# Patient Record
Sex: FEMALE | Race: WHITE | NOT HISPANIC OR LATINO | Employment: FULL TIME | URBAN - NONMETROPOLITAN AREA
[De-identification: names, ages, dates, MRNs, and addresses within clinical notes are randomized per-mention and may not be internally consistent; named-entity substitution may affect disease eponyms.]

---

## 2017-11-02 ENCOUNTER — OFFICE VISIT (OUTPATIENT)
Dept: CARDIOLOGY | Facility: CLINIC | Age: 27
End: 2017-11-02

## 2017-11-02 ENCOUNTER — TELEPHONE (OUTPATIENT)
Dept: CARDIOLOGY | Facility: CLINIC | Age: 27
End: 2017-11-02

## 2017-11-02 VITALS
SYSTOLIC BLOOD PRESSURE: 121 MMHG | HEART RATE: 89 BPM | BODY MASS INDEX: 23 KG/M2 | OXYGEN SATURATION: 100 % | WEIGHT: 121.8 LBS | DIASTOLIC BLOOD PRESSURE: 77 MMHG | HEIGHT: 61 IN

## 2017-11-02 DIAGNOSIS — R00.0 TACHYCARDIA: ICD-10-CM

## 2017-11-02 DIAGNOSIS — R00.2 PALPITATION: Primary | ICD-10-CM

## 2017-11-02 DIAGNOSIS — R06.02 SHORTNESS OF BREATH: ICD-10-CM

## 2017-11-02 PROCEDURE — 99204 OFFICE O/P NEW MOD 45 MIN: CPT | Performed by: PHYSICIAN ASSISTANT

## 2017-11-02 PROCEDURE — 93000 ELECTROCARDIOGRAM COMPLETE: CPT | Performed by: PHYSICIAN ASSISTANT

## 2017-11-02 NOTE — TELEPHONE ENCOUNTER
----- Message from KRISTI Wen sent at 11/2/2017 10:21 AM EDT -----  Regarding: Labs  Please obtain labs from PCP.

## 2017-11-02 NOTE — PROGRESS NOTES
Subjective   Dustin Navarrete is a 27 y.o. female     Chief Complaint   Patient presents with   • Establish Care     patient appears in office today to [facility] cardiac care ; denies CP/SOA   • Palpitations     patient states she is having increased episodes of palpitations at random   CC:  Fast heart beat with shortness of air.    HPI  The patient presents in today for initial evaluation.  She is referred to us in the setting of palpitations/tachycardia with associated shortness of air.  The patient reports that she has had no cardiovascular issues or symptoms in the past.  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  She is now working third shift.  She feels that the majority of symptoms may have started with stress/anxiety from her potential occupation.  She notes that at times, she will feel an irregularity in heart rhythm as well as a tachycardic sensation.  This can last just a couple of seconds or can reoccur numerous times throughout the day.  Typically, and worse than usual, she will become very short of air.  She cannot associate this with increased caffeine use or any other lifestyle issue.  The patient has no associated syncope.  She has no associated chest pain at that time.  She has found nothing else which can potentially alleviate or exacerbate symptoms.  She does note that exercise capacity has remained normal.  She tries to work out as often as she can.  She has no limitation with palpitations or shortness of air at that time.  She did see her primary care recently and had labs performed.  She tells me that those were normal.  We have requested copies of those.  The patient has no further complaints otherwise and feels that she is doing well outside of palpitations.      No current outpatient prescriptions on file.     No current facility-administered medications for this visit.        Review of patient's allergies indicates no known allergies.    History reviewed. No pertinent past  medical history.    Social History     Social History   • Marital status: Single     Spouse name: N/A   • Number of children: N/A   • Years of education: N/A     Occupational History   • Not on file.     Social History Main Topics   • Smoking status: Never Smoker   • Smokeless tobacco: Never Used   • Alcohol use Not on file   • Drug use: No   • Sexual activity: Defer     Other Topics Concern   • Not on file     Social History Narrative   • No narrative on file       Family History   Problem Relation Age of Onset   • Adopted: Yes   • Family history unknown: Yes       Review of Systems   Constitutional: Negative.  Negative for fatigue.   HENT: Positive for sore throat (from drainage). Negative for congestion, rhinorrhea, sinus pain, sinus pressure and sneezing.    Eyes: Positive for visual disturbance (wears glasses/contacts).   Respiratory: Negative.  Negative for cough, chest tightness, shortness of breath (denies SOA) and wheezing.    Cardiovascular: Positive for palpitations (increased episodes palpitations/flutters). Negative for chest pain (denies CP) and leg swelling.   Gastrointestinal: Negative.  Negative for abdominal pain, constipation, diarrhea, nausea and vomiting.   Endocrine: Negative.  Negative for cold intolerance, heat intolerance, polyphagia and polyuria.   Genitourinary: Negative.  Negative for difficulty urinating, frequency and urgency.   Musculoskeletal: Negative.  Negative for arthralgias, back pain, myalgias, neck pain and neck stiffness.   Skin: Negative.  Negative for rash and wound.   Allergic/Immunologic: Positive for environmental allergies (seaonal allergies). Negative for food allergies.   Neurological: Negative.  Negative for dizziness, weakness, light-headedness and headaches.   Hematological: Negative.  Does not bruise/bleed easily.   Psychiatric/Behavioral: Negative for agitation, confusion and sleep disturbance (denies waking up smothering/SOA). The patient is not nervous/anxious.   "      Objective     /77 (BP Location: Left arm, Patient Position: Sitting)  Pulse 89  Ht 61\" (154.9 cm)  Wt 121 lb 12.8 oz (55.2 kg)  SpO2 100%  BMI 23.01 kg/m2    Lab Results (most recent)     None          Physical Exam   Constitutional: She is oriented to person, place, and time. She appears well-developed and well-nourished. No distress.   HENT:   Head: Normocephalic and atraumatic.   Eyes: Conjunctivae and EOM are normal. Pupils are equal, round, and reactive to light.   Neck: Normal range of motion. Neck supple. No JVD present. No tracheal deviation present.   Cardiovascular: Normal rate, regular rhythm, normal heart sounds and intact distal pulses.    Pulmonary/Chest: Effort normal and breath sounds normal.   Abdominal: Soft. Bowel sounds are normal. She exhibits no distension and no mass. There is no tenderness. There is no rebound and no guarding.   Musculoskeletal: Normal range of motion. She exhibits no edema, tenderness or deformity.   Neurological: She is alert and oriented to person, place, and time.   Skin: Skin is warm and dry. No rash noted. No erythema. No pallor.   Psychiatric: She has a normal mood and affect. Her behavior is normal. Judgment and thought content normal.   Nursing note and vitals reviewed.      Procedure     ECG 12 Lead  Date/Time: 11/2/2017 9:49 AM  Performed by: RAMON HUNTER  Authorized by: RAMON HUNTER   Comments: Palpitations    Sinus arrhythmia, normal axis, RSR prime in V1 and V2, minor nonspecific ST and T-wave changes, no acute changes noted.                 Assessment/Plan      Diagnosis Plan   1. Palpitation  ECG 12 Lead    Adult Transthoracic Echo Complete W/ Cont if Necessary Per Protocol    Cardiac Event Monitor    Adult Transthoracic Echo Complete W/ Cont if Necessary Per Protocol    Cardiac Event Monitor   2. Tachycardia     3. Shortness of breath  Adult Transthoracic Echo Complete W/ Cont if Necessary Per Protocol    Cardiac Event Monitor    " Adult Transthoracic Echo Complete W/ Cont if Necessary Per Protocol    Cardiac Event Monitor     I would like to schedule for an event monitor to see what the patient is experiencing when she complains of palpitations.  With symptoms otherwise, I will schedule for an event monitor.  We have requested recent labs including her CBC, CMP, and TSH.  I would like to see her back after testing and recommend further to her at that time.  For any issues prior to follow-up, she will call to us.

## 2017-11-21 ENCOUNTER — OUTSIDE FACILITY SERVICE (OUTPATIENT)
Dept: CARDIOLOGY | Facility: CLINIC | Age: 27
End: 2017-11-21

## 2017-11-21 PROCEDURE — 93272 ECG/REVIEW INTERPRET ONLY: CPT | Performed by: INTERNAL MEDICINE

## 2022-08-18 ENCOUNTER — OFFICE VISIT (OUTPATIENT)
Dept: SURGERY | Facility: CLINIC | Age: 32
End: 2022-08-18

## 2022-08-18 VITALS
HEART RATE: 72 BPM | HEIGHT: 61 IN | DIASTOLIC BLOOD PRESSURE: 60 MMHG | SYSTOLIC BLOOD PRESSURE: 102 MMHG | WEIGHT: 125.6 LBS | BODY MASS INDEX: 23.71 KG/M2

## 2022-08-18 DIAGNOSIS — R22.32 SUBCUTANEOUS MASS OF LEFT FOREARM: Primary | ICD-10-CM

## 2022-08-18 PROCEDURE — 99202 OFFICE O/P NEW SF 15 MIN: CPT | Performed by: SURGERY

## 2022-08-18 PROCEDURE — 76882 US LMTD JT/FCL EVL NVASC XTR: CPT | Performed by: SURGERY

## 2022-08-18 NOTE — PROGRESS NOTES
"Subjective   Dustin LAWLER is a 31 y.o. female here today for cyst on left arm.    History of Present Illness  Ms. Lawler was seen in the office today for evaluation of a subcutaneous mass of the left forearm.  It is not really symptomatic but patient was advised to have it evaluated and see if excision was indicated rather than wait for her to get larger.  No erythema or drainage.  Patient feels that it has decreased in size since she first noticed it  No Known Allergies  No current outpatient medications on file.     No current facility-administered medications for this visit.     History reviewed. No pertinent past medical history.  Past Surgical History:   Procedure Laterality Date   • MOUTH SURGERY         Pertinent Review of Systems:  Respiratory: no shortness of breath  Cardiovascular: no chest pain  Other pertinent:      Objective   /60 (BP Location: Left arm)   Pulse 72   Ht 154.9 cm (61\")   Wt 57 kg (125 lb 9.6 oz)   BMI 23.73 kg/m²   Physical Exam  Well-developed well-nourished female no acute distress  Left forearm: In the medial upper area there is a subcentimeter palpable mass.  It is less well-defined than the typical sebaceous cyst and not consistent with a lipoma.  Procedures   Ultrasound soft tissue left forearm    Indication: Subcutaneous mass  Description: With use of the 12.5 MHz transducer the area of clinical concern was scanned in multiple planes.  Findings: In the area of clinical concern a hypoechoic mass measuring 0.7 x 0.6 cm is identified.  There does appear to be some surrounding inflammation.  This does not extend to the dermis and is therefore not consistent with a sebaceous cyst  Impression: Inflammatory subcutaneous mass left forearm  Plan: Follow-up as clinically indicated  Results/Data:      Assessment & Plan   Subcutaneous mass left forearm.  By ultrasound this does appear to have some surrounding inflammation which would be consistent with the patient's history of size " decrease.    As the area is getting smaller do not see an indication for excision at this time.  Unless the lesion becomes significantly larger observation only         Discussion/Summary    Time spent:     BMI is within normal parameters. No other follow-up for BMI required.       No future appointments.      Please note that portions of this note were completed with a voice recognition program.

## 2023-03-02 ENCOUNTER — TELEPHONE (OUTPATIENT)
Dept: CARDIOLOGY | Facility: CLINIC | Age: 33
End: 2023-03-02

## 2023-05-11 ENCOUNTER — OFFICE VISIT (OUTPATIENT)
Dept: CARDIOLOGY | Facility: CLINIC | Age: 33
End: 2023-05-11
Payer: COMMERCIAL

## 2023-05-11 VITALS
HEIGHT: 61 IN | HEART RATE: 89 BPM | BODY MASS INDEX: 22.16 KG/M2 | SYSTOLIC BLOOD PRESSURE: 124 MMHG | DIASTOLIC BLOOD PRESSURE: 82 MMHG | WEIGHT: 117.4 LBS

## 2023-05-11 DIAGNOSIS — R00.2 PALPITATIONS: Primary | ICD-10-CM

## 2023-05-11 DIAGNOSIS — R06.02 SHORTNESS OF BREATH: ICD-10-CM

## 2023-05-11 DIAGNOSIS — F17.290 VAPING NICOTINE DEPENDENCE, TOBACCO PRODUCT: ICD-10-CM

## 2023-05-11 DIAGNOSIS — R01.1 MURMUR, CARDIAC: ICD-10-CM

## 2023-05-11 DIAGNOSIS — R07.2 PRECORDIAL PAIN: ICD-10-CM

## 2023-05-11 DIAGNOSIS — R42 DIZZINESS: ICD-10-CM

## 2023-05-11 PROCEDURE — 99204 OFFICE O/P NEW MOD 45 MIN: CPT | Performed by: INTERNAL MEDICINE

## 2023-05-11 PROCEDURE — 93000 ELECTROCARDIOGRAM COMPLETE: CPT | Performed by: INTERNAL MEDICINE

## 2023-05-11 RX ORDER — BISOPROLOL FUMARATE 5 MG/1
5 TABLET, FILM COATED ORAL DAILY
Qty: 30 TABLET | Refills: 6 | Status: SHIPPED | OUTPATIENT
Start: 2023-05-11

## 2023-05-11 RX ORDER — NICOTINE 10 MG
CARTRIDGE (EA) INHALATION
Qty: 168 EACH | Refills: 5 | Status: SHIPPED | OUTPATIENT
Start: 2023-05-11

## 2023-05-11 NOTE — LETTER
"May 11, 2023       No Recipients    Patient: Dustin LAWLER   YOB: 1990   Date of Visit: 5/11/2023       Dear Dr. Ortiz Recipients:    Thank you for referring Dustin LAWLER to me for evaluation. Below are the relevant portions of my assessment and plan of care.    If you have questions, please do not hesitate to call me. I look forward to following Dustin along with you.         Sincerely,        Cyndy Whipple MD        CC:   No Recipients    Cyndy Whipple MD  05/11/23 1326  Sign when Signing Visit  Dustin LAWLER  reports that she has never smoked. She has never used smokeless tobacco.. I have educated her on the risk of diseases from using tobacco products such as cancer, COPD and heart disease.     I advised her to quit and she is willing to quit. We have discussed the following method/s for tobacco cessation:  Education Material Counseling Prescription Medicaiton.  Together we have set a quit date for 1 week from today.  She will follow up with me in 6 months or sooner to check on her progress.    I spent 3  minutes counseling the patient.            Cyndy Whipple MD  05/11/23 1326  Sign when Signing Visit  Chief Complaint   Patient presents with   • Establish Care     Patient referred per PCP for syncope.   • Palpitations     Has had for several years, states \"they come and go\". Notices more with stress. Has some shortness of breath associated with palpitations.   • Dizziness     Notices when squatting down and stands up, vision turns black and ringing in ears, will have to hold to walls. Reports episodes only last 15 seconds.   • Chest Pain     Will have occasional sharp pain in chest, at times will cause her to become short of breath. Pain lasting 10 minutes.   • Aspirin     Patient does not take.        CARDIAC COMPLAINTS  chest pressure/discomfort, Dizziness and palpitations     Subjective   Dustin LAWLER is a 32 y.o. female came in today with her mother for the initial cardiac " evaluation.  She has no previously diagnosed cardiac history.  She has been having symptoms of palpitation on and off for many years.  She never paid attention to it because it normally last for few seconds and subsides.  It usually occurs with stress.  Recently she started noticing the palpitations occurring more frequently and longer lasting.  It is now associated with shortness of breath and also started having dizziness.  She also notices the dizziness occurs when she suddenly squats down or when she suddenly turned back.  It is associated with some ringing in the ears and she also noticed the room spinning.  It normally last about 15 seconds.  She also started noticing chest pain in the form of sharp pain in the middle part of the chest occurring suddenly without any precipitating factors last about 10 minutes and then subsides.  She did undergo lab work and found to have a normal thyroid function test.  Normal electrolytes normal blood count.  She has no history of smoking but unfortunately does use vaping.  Her biological mother  of a cancer.  She does not know much about her biological family.    Past Surgical History:   Procedure Laterality Date   • MOUTH SURGERY         Current Outpatient Medications   Medication Sig Dispense Refill   • bisoprolol (ZEBeta) 5 MG tablet Take 1 tablet by mouth Daily. 30 tablet 6   • nicotine (Nicotrol) 10 MG inhaler 1 cartridge over 20 minutes. Max dose 16 cartridges daily 168 each 5     No current facility-administered medications for this visit.          ALLERGIES:  Patient has no known allergies.    Past Medical History:   Diagnosis Date   • Anemia        Social History     Tobacco Use   Smoking Status Never   Smokeless Tobacco Never          Family History   Adopted: Yes   Problem Relation Age of Onset   • Cancer Mother    • Lung cancer Maternal Grandmother    • No Known Problems Daughter    • No Known Problems Son        Review of Systems   Constitutional: Negative  "for decreased appetite and malaise/fatigue.   HENT: Negative for congestion and sore throat.    Eyes: Negative for blurred vision, double vision and visual disturbance.   Cardiovascular: Positive for chest pain, dyspnea on exertion and palpitations.   Respiratory: Positive for shortness of breath. Negative for snoring.    Endocrine: Negative for cold intolerance and heat intolerance.   Hematologic/Lymphatic: Negative for adenopathy. Does not bruise/bleed easily.   Skin: Negative for itching, nail changes and skin cancer.   Musculoskeletal: Negative for arthritis and myalgias.   Gastrointestinal: Negative for abdominal pain, dysphagia and heartburn.   Genitourinary: Negative for bladder incontinence and frequency.   Neurological: Positive for dizziness. Negative for seizures and vertigo.   Psychiatric/Behavioral: Negative for altered mental status.   Allergic/Immunologic: Negative for environmental allergies and hives.       Diabetes- No  Thyroid- normal    Objective     /82 (BP Location: Left arm)   Pulse 89   Ht 154.9 cm (61\")   Wt 53.3 kg (117 lb 6.4 oz)   BMI 22.18 kg/m²     Vitals and nursing note reviewed.   Constitutional:       Appearance: Healthy appearance. Not in distress.   Eyes:      Conjunctiva/sclera: Conjunctivae normal.      Pupils: Pupils are equal, round, and reactive to light.   HENT:      Head: Normocephalic.   Pulmonary:      Effort: Pulmonary effort is normal.      Breath sounds: Normal breath sounds.   Cardiovascular:      PMI at left midclavicular line. Normal rate. Regular rhythm.      Murmurs: There is a grade 3/6 high frequency blowing holosystolic murmur at the apex.   Abdominal:      General: Bowel sounds are normal.      Palpations: Abdomen is soft.   Musculoskeletal: Normal range of motion.      Cervical back: Normal range of motion and neck supple. Skin:     General: Skin is warm and dry.   Neurological:      Mental Status: Alert, oriented to person, place, and time and " oriented to person, place and time.           ECG 12 Lead    Date/Time: 5/11/2023 1:25 PM  Performed by: Cyndy Whipple MD  Authorized by: Cyndy Whipple MD   Previous ECG: no previous ECG available  Rhythm: sinus rhythm and sinus arrhythmia  Rate: normal  QRS axis: normal    Clinical impression: normal ECG             @ASSESSMENT/PLAN@  BMI is within normal parameters. No other follow-up for BMI required.     Diagnoses and all orders for this visit:    1. Palpitations (Primary)  -     bisoprolol (ZEBeta) 5 MG tablet; Take 1 tablet by mouth Daily.  Dispense: 30 tablet; Refill: 6  -     Treadmill Stress Test; Future  -     Holter Monitor - 72 Hour Up To 15 Days; Future    2. Dizziness  -     Holter Monitor - 72 Hour Up To 15 Days; Future    3. Precordial pain  -     bisoprolol (ZEBeta) 5 MG tablet; Take 1 tablet by mouth Daily.  Dispense: 30 tablet; Refill: 6  -     Treadmill Stress Test; Future  -     Adult Transthoracic Echo Complete W/ Cont if Necessary Per Protocol; Future    4. Murmur, cardiac  -     Adult Transthoracic Echo Complete W/ Cont if Necessary Per Protocol; Future    5. Shortness of breath    6. Vaping nicotine dependence, tobacco product  -     nicotine (Nicotrol) 10 MG inhaler; 1 cartridge over 20 minutes. Max dose 16 cartridges daily  Dispense: 168 each; Refill: 5    At baseline her heart rate is stable.  Her blood pressure is stable.  Her EKG shows sinus rhythm, normal IA interval no delta waves normal QTc.  Her clinical examination reveals a BMI of 22.    Regarding the palpitation, I think it is most likely from the tachycardia itself.  At this time I started her on bisoprolol 5 mg with half a tablet a day for the first 1 week and if she can tolerate it we can increase it to 1 tablet.  I also advised her to wear a monitor for a week to see what kind of arrhythmia she gets.  If it is 1 is sinus tachycardia then the bisoprolol alone should be enough but if she has evidence of PSVT or  PAF then she may need a 1C antiarrhythmic medication    Regarding the dizziness, I think it is mostly from the tachyarrhythmia.  The other possibility of this could be secondary to vertigo cannot be ruled out.  If it is occurring more frequently then we can try meclizine    Regarding the chest pain, it appears to be very atypical.  I advised her to undergo a stress test to evaluate her functional status, chronotropic response, blood pressure response and to rule out any stress-induced arrhythmia or ischemia.  I also advised her to undergo an echocardiogram to evaluate for the LV function, valvular structures as well as to rule out pericardial effusion    Regarding the cardiac murmur, it appears to be secondary to mitral regurgitation.  We will review the echocardiogram but the valvular structures    Regarding the shortness of breath, I explained to her that vaping can cause a lot of pulmonary problems.  I talked to her about use of Nicotrol inhalers and gave her prescription as well as booklets regarding that    Based on her response and the results of the test, further recommendations will be made              Electronically signed by Cyndy Whipple MD May 11, 2023 13:13 EDT

## 2023-05-11 NOTE — PROGRESS NOTES
"Chief Complaint   Patient presents with   • Establish Care     Patient referred per PCP for syncope.   • Palpitations     Has had for several years, states \"they come and go\". Notices more with stress. Has some shortness of breath associated with palpitations.   • Dizziness     Notices when squatting down and stands up, vision turns black and ringing in ears, will have to hold to walls. Reports episodes only last 15 seconds.   • Chest Pain     Will have occasional sharp pain in chest, at times will cause her to become short of breath. Pain lasting 10 minutes.   • Aspirin     Patient does not take.        CARDIAC COMPLAINTS  chest pressure/discomfort, Dizziness and palpitations      Subjective   Dustin LAWLER is a 32 y.o. female came in today with her mother for the initial cardiac evaluation.  She has no previously diagnosed cardiac history.  She has been having symptoms of palpitation on and off for many years.  She never paid attention to it because it normally last for few seconds and subsides.  It usually occurs with stress.  Recently she started noticing the palpitations occurring more frequently and longer lasting.  It is now associated with shortness of breath and also started having dizziness.  She also notices the dizziness occurs when she suddenly squats down or when she suddenly turned back.  It is associated with some ringing in the ears and she also noticed the room spinning.  It normally last about 15 seconds.  She also started noticing chest pain in the form of sharp pain in the middle part of the chest occurring suddenly without any precipitating factors last about 10 minutes and then subsides.  She did undergo lab work and found to have a normal thyroid function test.  Normal electrolytes normal blood count.  She has no history of smoking but unfortunately does use vaping.  Her biological mother  of a cancer.  She does not know much about her biological family.    Past Surgical History: " "  Procedure Laterality Date   • MOUTH SURGERY         Current Outpatient Medications   Medication Sig Dispense Refill   • bisoprolol (ZEBeta) 5 MG tablet Take 1 tablet by mouth Daily. 30 tablet 6   • nicotine (Nicotrol) 10 MG inhaler 1 cartridge over 20 minutes. Max dose 16 cartridges daily 168 each 5     No current facility-administered medications for this visit.           ALLERGIES:  Patient has no known allergies.    Past Medical History:   Diagnosis Date   • Anemia        Social History     Tobacco Use   Smoking Status Never   Smokeless Tobacco Never          Family History   Adopted: Yes   Problem Relation Age of Onset   • Cancer Mother    • Lung cancer Maternal Grandmother    • No Known Problems Daughter    • No Known Problems Son        Review of Systems   Constitutional: Negative for decreased appetite and malaise/fatigue.   HENT: Negative for congestion and sore throat.    Eyes: Negative for blurred vision, double vision and visual disturbance.   Cardiovascular: Positive for chest pain, dyspnea on exertion and palpitations.   Respiratory: Positive for shortness of breath. Negative for snoring.    Endocrine: Negative for cold intolerance and heat intolerance.   Hematologic/Lymphatic: Negative for adenopathy. Does not bruise/bleed easily.   Skin: Negative for itching, nail changes and skin cancer.   Musculoskeletal: Negative for arthritis and myalgias.   Gastrointestinal: Negative for abdominal pain, dysphagia and heartburn.   Genitourinary: Negative for bladder incontinence and frequency.   Neurological: Positive for dizziness. Negative for seizures and vertigo.   Psychiatric/Behavioral: Negative for altered mental status.   Allergic/Immunologic: Negative for environmental allergies and hives.       Diabetes- No  Thyroid- normal    Objective     /82 (BP Location: Left arm)   Pulse 89   Ht 154.9 cm (61\")   Wt 53.3 kg (117 lb 6.4 oz)   BMI 22.18 kg/m²     Vitals and nursing note reviewed. "   Constitutional:       Appearance: Healthy appearance. Not in distress.   Eyes:      Conjunctiva/sclera: Conjunctivae normal.      Pupils: Pupils are equal, round, and reactive to light.   HENT:      Head: Normocephalic.   Pulmonary:      Effort: Pulmonary effort is normal.      Breath sounds: Normal breath sounds.   Cardiovascular:      PMI at left midclavicular line. Normal rate. Regular rhythm.      Murmurs: There is a grade 3/6 high frequency blowing holosystolic murmur at the apex.   Abdominal:      General: Bowel sounds are normal.      Palpations: Abdomen is soft.   Musculoskeletal: Normal range of motion.      Cervical back: Normal range of motion and neck supple. Skin:     General: Skin is warm and dry.   Neurological:      Mental Status: Alert, oriented to person, place, and time and oriented to person, place and time.           ECG 12 Lead    Date/Time: 5/11/2023 1:25 PM  Performed by: Cyndy Whipple MD  Authorized by: Cyndy Whipple MD   Previous ECG: no previous ECG available  Rhythm: sinus rhythm and sinus arrhythmia  Rate: normal  QRS axis: normal    Clinical impression: normal ECG              @ASSESSMENT/PLAN@  BMI is within normal parameters. No other follow-up for BMI required.     Diagnoses and all orders for this visit:    1. Palpitations (Primary)  -     bisoprolol (ZEBeta) 5 MG tablet; Take 1 tablet by mouth Daily.  Dispense: 30 tablet; Refill: 6  -     Treadmill Stress Test; Future  -     Holter Monitor - 72 Hour Up To 15 Days; Future    2. Dizziness  -     Holter Monitor - 72 Hour Up To 15 Days; Future    3. Precordial pain  -     bisoprolol (ZEBeta) 5 MG tablet; Take 1 tablet by mouth Daily.  Dispense: 30 tablet; Refill: 6  -     Treadmill Stress Test; Future  -     Adult Transthoracic Echo Complete W/ Cont if Necessary Per Protocol; Future    4. Murmur, cardiac  -     Adult Transthoracic Echo Complete W/ Cont if Necessary Per Protocol; Future    5. Shortness of breath    6.  Vaping nicotine dependence, tobacco product  -     nicotine (Nicotrol) 10 MG inhaler; 1 cartridge over 20 minutes. Max dose 16 cartridges daily  Dispense: 168 each; Refill: 5    At baseline her heart rate is stable.  Her blood pressure is stable.  Her EKG shows sinus rhythm, normal SD interval no delta waves normal QTc.  Her clinical examination reveals a BMI of 22.    Regarding the palpitation, I think it is most likely from the tachycardia itself.  At this time I started her on bisoprolol 5 mg with half a tablet a day for the first 1 week and if she can tolerate it we can increase it to 1 tablet.  I also advised her to wear a monitor for a week to see what kind of arrhythmia she gets.  If it is 1 is sinus tachycardia then the bisoprolol alone should be enough but if she has evidence of PSVT or PAF then she may need a 1C antiarrhythmic medication    Regarding the dizziness, I think it is mostly from the tachyarrhythmia.  The other possibility of this could be secondary to vertigo cannot be ruled out.  If it is occurring more frequently then we can try meclizine    Regarding the chest pain, it appears to be very atypical.  I advised her to undergo a stress test to evaluate her functional status, chronotropic response, blood pressure response and to rule out any stress-induced arrhythmia or ischemia.  I also advised her to undergo an echocardiogram to evaluate for the LV function, valvular structures as well as to rule out pericardial effusion    Regarding the cardiac murmur, it appears to be secondary to mitral regurgitation.  We will review the echocardiogram but the valvular structures    Regarding the shortness of breath, I explained to her that vaping can cause a lot of pulmonary problems.  I talked to her about use of Nicotrol inhalers and gave her prescription as well as booklets regarding that    Based on her response and the results of the test, further recommendations will be made                Electronically signed by Cyndy Whipple MD May 11, 2023 13:13 EDT

## 2023-05-11 NOTE — PROGRESS NOTES
Dustin LAWLER  reports that she has never smoked. She has never used smokeless tobacco.. I have educated her on the risk of diseases from using tobacco products such as cancer, COPD and heart disease.     I advised her to quit and she is willing to quit. We have discussed the following method/s for tobacco cessation:  Education Material Counseling Prescription Medicaiton.  Together we have set a quit date for 1 week from today.  She will follow up with me in 6 months or sooner to check on her progress.    I spent 3  minutes counseling the patient.

## 2023-05-24 ENCOUNTER — HOSPITAL ENCOUNTER (OUTPATIENT)
Dept: CARDIOLOGY | Facility: HOSPITAL | Age: 33
Discharge: HOME | End: 2023-05-24
Payer: COMMERCIAL

## 2023-05-24 VITALS — WEIGHT: 117.5 LBS | BODY MASS INDEX: 22.19 KG/M2 | HEIGHT: 61 IN

## 2023-05-24 DIAGNOSIS — R07.2 PRECORDIAL PAIN: ICD-10-CM

## 2023-05-24 DIAGNOSIS — R01.1 MURMUR, CARDIAC: ICD-10-CM

## 2023-05-24 DIAGNOSIS — R00.2 PALPITATIONS: ICD-10-CM

## 2023-05-24 LAB
AORTIC DIMENSIONLESS INDEX: 0.86 (DI)
BH CV ECHO MEAS - ACS: 1.54 CM
BH CV ECHO MEAS - AO MAX PG: 5.8 MMHG
BH CV ECHO MEAS - AO MEAN PG: 3.1 MMHG
BH CV ECHO MEAS - AO ROOT DIAM: 2.5 CM
BH CV ECHO MEAS - AO V2 MAX: 120.8 CM/SEC
BH CV ECHO MEAS - AO V2 VTI: 25 CM
BH CV ECHO MEAS - EDV(CUBED): 60.5 ML
BH CV ECHO MEAS - EF(MOD-BP): 55 %
BH CV ECHO MEAS - EF_3D-VOL: 62 %
BH CV ECHO MEAS - ESV(CUBED): 22.5 ML
BH CV ECHO MEAS - FS: 28 %
BH CV ECHO MEAS - IVS/LVPW: 0.95 CM
BH CV ECHO MEAS - IVSD: 0.7 CM
BH CV ECHO MEAS - LA DIMENSION: 2.8 CM
BH CV ECHO MEAS - LAT PEAK E' VEL: 16.9 CM/SEC
BH CV ECHO MEAS - LV MASS(C)D: 78.9 GRAMS
BH CV ECHO MEAS - LV MAX PG: 4.4 MMHG
BH CV ECHO MEAS - LV MEAN PG: 2.22 MMHG
BH CV ECHO MEAS - LV V1 MAX: 104.3 CM/SEC
BH CV ECHO MEAS - LV V1 VTI: 21.1 CM
BH CV ECHO MEAS - LVIDD: 3.9 CM
BH CV ECHO MEAS - LVIDS: 2.8 CM
BH CV ECHO MEAS - LVPWD: 0.74 CM
BH CV ECHO MEAS - MED PEAK E' VEL: 13.5 CM/SEC
BH CV ECHO MEAS - MV A MAX VEL: 64.3 CM/SEC
BH CV ECHO MEAS - MV DEC SLOPE: 856.4 CM/SEC2
BH CV ECHO MEAS - MV DEC TIME: 0.22 MSEC
BH CV ECHO MEAS - MV E MAX VEL: 79.3 CM/SEC
BH CV ECHO MEAS - MV E/A: 1.23
BH CV ECHO MEAS - MV MAX PG: 4.5 MMHG
BH CV ECHO MEAS - MV MEAN PG: 2.34 MMHG
BH CV ECHO MEAS - MV P1/2T: 35.7 MSEC
BH CV ECHO MEAS - MV V2 VTI: 23.2 CM
BH CV ECHO MEAS - MVA(P1/2T): 6.2 CM2
BH CV ECHO MEAS - PA V2 MAX: 115 CM/SEC
BH CV ECHO MEAS - RAP SYSTOLE: 8 MMHG
BH CV ECHO MEAS - RV MAX PG: 1.69 MMHG
BH CV ECHO MEAS - RV V1 MAX: 64.9 CM/SEC
BH CV ECHO MEAS - RV V1 VTI: 14.9 CM
BH CV ECHO MEAS - RVDD: 2.5 CM
BH CV ECHO MEAS - RVSP: 23.1 MMHG
BH CV ECHO MEAS - TAPSE (>1.6): 2.21 CM
BH CV ECHO MEAS - TR MAX PG: 15.1 MMHG
BH CV ECHO MEAS - TR MAX VEL: 194.2 CM/SEC
BH CV ECHO MEASUREMENTS AVERAGE E/E' RATIO: 5.22
BH CV STRESS DURATION MIN STAGE 1: 3
BH CV STRESS DURATION SEC STAGE 1: 0
BH CV STRESS GRADE STAGE 1: 10
BH CV STRESS METS STAGE 1: 5
BH CV STRESS PROTOCOL 1: NORMAL
BH CV STRESS RECOVERY BP: NORMAL MMHG
BH CV STRESS RECOVERY HR: 114 BPM
BH CV STRESS SPEED STAGE 1: 1.7
BH CV STRESS STAGE 1: 1
BH CV XLRA - TDI S': 11.2 CM/SEC
MAXIMAL PREDICTED HEART RATE: 190 BPM
PERCENT MAX PREDICTED HR: 101.58 %
SINUS: 2.23 CM
STRESS BASELINE BP: NORMAL MMHG
STRESS BASELINE HR: 93 BPM
STRESS PERCENT HR: 120 %
STRESS POST ESTIMATED WORKLOAD: 13.4 METS
STRESS POST EXERCISE DUR MIN: 10 MIN
STRESS POST PEAK BP: NORMAL MMHG
STRESS POST PEAK HR: 193 BPM
STRESS TARGET HR: 162 BPM

## 2023-05-24 PROCEDURE — 93306 TTE W/DOPPLER COMPLETE: CPT

## 2023-05-24 PROCEDURE — 93017 CV STRESS TEST TRACING ONLY: CPT

## 2023-05-24 PROCEDURE — 93306 TTE W/DOPPLER COMPLETE: CPT | Performed by: INTERNAL MEDICINE

## 2023-05-25 RX ORDER — BISOPROLOL FUMARATE 10 MG/1
10 TABLET, FILM COATED ORAL DAILY
Qty: 90 TABLET | Refills: 3 | Status: SHIPPED | OUTPATIENT
Start: 2023-05-25

## 2023-11-16 ENCOUNTER — OFFICE VISIT (OUTPATIENT)
Dept: CARDIOLOGY | Facility: CLINIC | Age: 33
End: 2023-11-16
Payer: COMMERCIAL

## 2023-11-16 VITALS
HEART RATE: 80 BPM | WEIGHT: 120.4 LBS | DIASTOLIC BLOOD PRESSURE: 64 MMHG | HEIGHT: 60 IN | SYSTOLIC BLOOD PRESSURE: 100 MMHG | BODY MASS INDEX: 23.64 KG/M2

## 2023-11-16 DIAGNOSIS — G90.A POTS (POSTURAL ORTHOSTATIC TACHYCARDIA SYNDROME): ICD-10-CM

## 2023-11-16 DIAGNOSIS — I95.1 AUTONOMIC ORTHOSTATIC HYPOTENSION: ICD-10-CM

## 2023-11-16 DIAGNOSIS — R00.2 PALPITATIONS: Primary | ICD-10-CM

## 2023-11-16 PROCEDURE — 99214 OFFICE O/P EST MOD 30 MIN: CPT | Performed by: NURSE PRACTITIONER

## 2023-11-16 RX ORDER — BISOPROLOL FUMARATE 10 MG/1
10 TABLET, FILM COATED ORAL DAILY
Qty: 90 TABLET | Refills: 3 | Status: SHIPPED | OUTPATIENT
Start: 2023-11-16

## 2023-11-16 RX ORDER — FLUDROCORTISONE ACETATE 0.1 MG/1
0.1 TABLET ORAL DAILY
Qty: 30 TABLET | Refills: 1 | Status: SHIPPED | OUTPATIENT
Start: 2023-11-16

## 2023-11-16 NOTE — PROGRESS NOTES
Chief Complaint   Patient presents with    Follow-up     Pt is here for cardiac follow up.  Pt states her palpitations are better then they were.  She states she does have dizziness and dark vision when she goes from sitting to standing.  She denies CP or SOB.  Pt does not take a daily ASA.      Med Refill     Pt request 90 day refills to be sent to [facility] in [place].  Medications were verified by the pt.      Lab Work     Pt states their last labs were with her PCP.         Cardiac Complaints  palpitations      Subjective   Dustin LAWLER is a 33 y.o. female with palpitations who was referred in May 2023 for the same. She admitted to palpitations, SOA, and dizziness. She had a stress, holter, and echo after consult. Bisoprolol started. Stress negative for ischemia per EKG, echo with good LV function, and trace MR. Holter for 7 days average HR of 76, with rare PAC and PVC.    She comes today for follow up and admits palpitations are improved. She does however admit to dizziness and feeling like she will pass out with position changes. Labs with PCP, refills requested. Vape free since June 2023!          Cardiac History  Past Surgical History:   Procedure Laterality Date    CARDIOVASCULAR STRESS TEST  05/24/2023    10 Min. 13.4 METS. 100% THR. 142/80. Negative    CONVERTED (HISTORICAL) HOLTER  06/12/2023    > 7 Days. Avg 76. . Rare PAC & PVC    ECHO - CONVERTED  05/24/2023    S.Tach. EF 60%. Trace Mr. RVSP- 23 mmHg    MOUTH SURGERY         Current Outpatient Medications   Medication Sig Dispense Refill    bisoprolol (ZEBeta) 10 MG tablet Take 1 tablet by mouth Daily. 90 tablet 3    fludrocortisone 0.1 MG tablet Take 1 tablet by mouth Daily. 30 tablet 1     No current facility-administered medications for this visit.       Patient has no known allergies.    Past Medical History:   Diagnosis Date    Anemia        Social History     Socioeconomic History    Marital status: Single   Tobacco Use    Smoking  "status: Never    Smokeless tobacco: Never   Vaping Use    Vaping Use: Former    Quit date: 6/10/2023    Substances: Nicotine    Devices: Pre-filled or refillable cartridge   Substance and Sexual Activity    Alcohol use: Never    Drug use: No    Sexual activity: Defer       Family History   Adopted: Yes   Problem Relation Age of Onset    Cancer Mother     Lung cancer Maternal Grandmother     No Known Problems Daughter     No Known Problems Son        Review of Systems   Constitutional: Negative for malaise/fatigue and night sweats.   Cardiovascular:  Positive for palpitations. Negative for chest pain, claudication, irregular heartbeat, leg swelling, near-syncope, orthopnea and syncope.   Respiratory:  Negative for cough, shortness of breath and wheezing.    Musculoskeletal:  Negative for back pain, joint pain and stiffness.   Gastrointestinal:  Negative for anorexia, heartburn, melena and nausea.   Genitourinary:  Negative for dysuria, hematuria, hesitancy and nocturia.   Neurological:  Positive for dizziness and light-headedness.   Psychiatric/Behavioral:  Negative for depression and memory loss.            Objective     /64 (BP Location: Left arm, Patient Position: Sitting)   Pulse 80   Ht 152.4 cm (60\")   Wt 54.6 kg (120 lb 6.4 oz)   BMI 23.51 kg/m²     Constitutional:       Appearance: Not in distress.   Eyes:      Pupils: Pupils are equal, round, and reactive to light.   HENT:      Nose: Nose normal.   Pulmonary:      Effort: Pulmonary effort is normal.      Breath sounds: Normal breath sounds.   Cardiovascular:      PMI at left midclavicular line. Normal rate. Regular rhythm.      Murmurs: There is a systolic murmur.   Abdominal:      Palpations: Abdomen is soft.   Musculoskeletal: Normal range of motion.      Cervical back: Normal range of motion and neck supple. Skin:     General: Skin is warm and dry.   Neurological:      Mental Status: Alert.         Procedures         Diagnoses and all orders for " this visit:    1. Palpitations (Primary)    2. POTS (postural orthostatic tachycardia syndrome)    3. Autonomic orthostatic hypotension    Other orders  -     fludrocortisone 0.1 MG tablet; Take 1 tablet by mouth Daily.  Dispense: 30 tablet; Refill: 1  -     bisoprolol (ZEBeta) 10 MG tablet; Take 1 tablet by mouth Daily.  Dispense: 90 tablet; Refill: 3             Palpitations: Improved with zebeta, continue same.    POTS: Add florinef to current as pre-syncope noted and orthostatic hypotension noted with position changes. Will encourage fluids. If BP still low with addition, will increase to BID.    Refills per request    BMI noted at 23.51, good cardiac diet encouraged.              Problems Addressed this Visit          Cardiac and Vasculature    Palpitations - Primary     Other Visit Diagnoses       POTS (postural orthostatic tachycardia syndrome)        Autonomic orthostatic hypotension              Diagnoses         Codes Comments    Palpitations    -  Primary ICD-10-CM: R00.2  ICD-9-CM: 785.1     POTS (postural orthostatic tachycardia syndrome)     ICD-10-CM: G90.A  ICD-9-CM: 427.89     Autonomic orthostatic hypotension     ICD-10-CM: I95.1  ICD-9-CM: 458.0                          Electronically signed by Sonali Salazar, CLARISSE November 16, 2023 11:28 EST

## 2024-02-05 RX ORDER — FLUDROCORTISONE ACETATE 0.1 MG/1
0.1 TABLET ORAL DAILY
Qty: 30 TABLET | Refills: 1 | Status: SHIPPED | OUTPATIENT
Start: 2024-02-05

## 2024-05-28 RX ORDER — FLUDROCORTISONE ACETATE 0.1 MG/1
0.1 TABLET ORAL DAILY
Qty: 30 TABLET | Refills: 1 | Status: SHIPPED | OUTPATIENT
Start: 2024-05-28

## 2024-06-03 ENCOUNTER — TELEPHONE (OUTPATIENT)
Dept: CARDIOLOGY | Facility: CLINIC | Age: 34
End: 2024-06-03
Payer: COMMERCIAL

## 2024-06-03 NOTE — TELEPHONE ENCOUNTER
Caller: Dustin LAWLER    Relationship: Self    Best call back number: [phone]     What was the call regarding:   PT HAD FOLLOW APPT ON 5/23 AND DUE TO UNFORSEEN CIRCUMSTANCES, THEY HAD TO RESCHEDULE. NEXT AVAILABLE WAS  9/4. JUST WANTED TO MAKE OFFICE AWARE.  APPT NOTES: 6 MOS F/U

## 2024-09-23 RX ORDER — FLUDROCORTISONE ACETATE 0.1 MG/1
0.1 TABLET ORAL DAILY
Qty: 30 TABLET | Refills: 1 | Status: SHIPPED | OUTPATIENT
Start: 2024-09-23

## 2024-10-24 ENCOUNTER — OFFICE VISIT (OUTPATIENT)
Dept: CARDIOLOGY | Facility: CLINIC | Age: 34
End: 2024-10-24
Payer: COMMERCIAL

## 2024-10-24 VITALS
HEIGHT: 60 IN | DIASTOLIC BLOOD PRESSURE: 74 MMHG | SYSTOLIC BLOOD PRESSURE: 115 MMHG | BODY MASS INDEX: 26.78 KG/M2 | WEIGHT: 136.4 LBS | HEART RATE: 68 BPM

## 2024-10-24 DIAGNOSIS — G90.A POTS (POSTURAL ORTHOSTATIC TACHYCARDIA SYNDROME): Primary | ICD-10-CM

## 2024-10-24 DIAGNOSIS — I95.1 AUTONOMIC ORTHOSTATIC HYPOTENSION: ICD-10-CM

## 2024-10-24 DIAGNOSIS — R00.2 PALPITATIONS: ICD-10-CM

## 2024-10-24 PROCEDURE — 99214 OFFICE O/P EST MOD 30 MIN: CPT | Performed by: NURSE PRACTITIONER

## 2024-10-24 RX ORDER — ACETAMINOPHEN 500 MG
500 TABLET ORAL EVERY 6 HOURS PRN
COMMUNITY

## 2024-10-24 RX ORDER — FLUDROCORTISONE ACETATE 0.1 MG/1
0.1 TABLET ORAL DAILY
Qty: 30 TABLET | Refills: 3 | Status: SHIPPED | OUTPATIENT
Start: 2024-10-24

## 2024-10-24 RX ORDER — BISOPROLOL FUMARATE 10 MG/1
10 TABLET, FILM COATED ORAL DAILY
Qty: 90 TABLET | Refills: 3 | Status: SHIPPED | OUTPATIENT
Start: 2024-10-24

## 2024-10-24 NOTE — PROGRESS NOTES
Chief Complaint   Patient presents with    Follow-up     Cardiac management    Lab     No current labs.     Palpitations     May have 2 times monthly, doing better. Dizziness also doing better, only notices 2 times monthly.     Chest Pain     Has sharp pain in chest when she takes deep breath. Only notices about once monthly.     Med Refill     Need refills on cardiac medications-90 day        HPI:  HPI   Dustin LAWLER is a 34 y.o. female with palpitations who was referred in May 2023 for the same. She admitted to palpitations, SOA, and dizziness. She had a stress, holter, and echo after consult. Bisoprolol started. Stress negative for ischemia per EKG, echo with good LV function, and trace MR. Holter for 7 days average HR of 76, with rare PAC and PVC.     She comes today for follow up. Patient denies chest pain, pressure, palpitations, tightness, dizziness, shortness of air. She has rare chest pain that may be associated with stress. Her symptoms are controlled and much improved.     Cardiac History:    Past Surgical History:   Procedure Laterality Date    CARDIOVASCULAR STRESS TEST  05/24/2023    10 Min. 13.4 METS. 100% THR. 142/80. Negative    CONVERTED (HISTORICAL) HOLTER  06/12/2023    > 7 Days. Avg 76. . Rare PAC & PVC    ECHO - CONVERTED  05/24/2023    S.Tach. EF 60%. Trace MrCelestine RVSP- 23 mmHg    MOUTH SURGERY         Current Outpatient Medications   Medication Sig Dispense Refill    acetaminophen (TYLENOL) 500 MG tablet Take 1 tablet by mouth Every 6 (Six) Hours As Needed for Mild Pain.      aspirin-acetaminophen-caffeine (EXCEDRIN MIGRAINE) 250-250-65 MG per tablet Take 1 tablet by mouth Every 6 (Six) Hours As Needed for Headache.      bisoprolol (ZEBeta) 10 MG tablet Take 1 tablet by mouth Daily. 90 tablet 3    fludrocortisone 0.1 MG tablet Take 1 tablet by mouth Daily. 30 tablet 1     No current facility-administered medications for this visit.       Patient has no known allergies.    Past Medical  "History:   Diagnosis Date    Anemia     PCOS (polycystic ovarian syndrome)        Social History     Socioeconomic History    Marital status: Single   Tobacco Use    Smoking status: Never    Smokeless tobacco: Never   Vaping Use    Vaping status: Former    Quit date: 6/10/2023    Substances: Nicotine    Devices: Pre-filled or refillable cartridge   Substance and Sexual Activity    Alcohol use: Never    Drug use: No    Sexual activity: Defer       Family History   Adopted: Yes   Problem Relation Age of Onset    Cancer Mother     Lung cancer Maternal Grandmother     No Known Problems Daughter     No Known Problems Son        Vitals:   /74 (BP Location: Right arm, Patient Position: Sitting)   Pulse 68   Ht 152.4 cm (60\")   Wt 61.9 kg (136 lb 6.4 oz)   BMI 26.64 kg/m²     Physical Exam  Vitals and nursing note reviewed.   Neck:      Vascular: No carotid bruit.   Cardiovascular:      Rate and Rhythm: Normal rate and regular rhythm.      Pulses: Normal pulses.      Heart sounds: Normal heart sounds. No murmur heard.     No friction rub. No gallop.   Pulmonary:      Effort: Pulmonary effort is normal.      Breath sounds: Normal breath sounds and air entry.   Musculoskeletal:      Right lower leg: No edema.      Left lower leg: No edema.   Skin:     General: Skin is warm and dry.      Capillary Refill: Capillary refill takes less than 2 seconds.   Neurological:      Mental Status: She is alert and oriented to person, place, and time.     Procedures     Assessment & Plan     Diagnoses and all orders for this visit:    1. POTS (postural orthostatic tachycardia syndrome) (Primary)    2. Palpitations    3. Autonomic orthostatic hypotension    POTS  - Symptoms mostly controlled  - Continue bisoprolol, adequate fluid intake    Palpitations  - Controlled with bisoprolol, continue  - Limit caffeine    Autonomic orthostatic hypotension  - Continue fludrocortisone    Stable from a cardiac standpoint. No further testing " recommended at this time. No medication changes made today.  Refill sent to pharmacy    Visit Diagnoses:    ICD-10-CM ICD-9-CM   1. POTS (postural orthostatic tachycardia syndrome)  G90.A 427.89   2. Palpitations  R00.2 785.1   3. Autonomic orthostatic hypotension  I95.1 458.0       Meds Ordered During Visit:  No orders of the defined types were placed in this encounter.      Follow Up Appointment:   No follow-ups on file.           This document has been electronically signed by CLARISSE Powell  October 24, 2024 10:26 EDT    Dictated Utilizing Dragon Dictation: Part of this note may be an electronic transcription/translation of spoken language to printed text using the Dragon Dictation System.

## 2025-05-05 RX ORDER — FLUDROCORTISONE ACETATE 0.1 MG/1
TABLET ORAL DAILY
Qty: 90 TABLET | Refills: 1 | Status: SHIPPED | OUTPATIENT
Start: 2025-05-05